# Patient Record
(demographics unavailable — no encounter records)

---

## 2024-10-31 NOTE — REVIEW OF SYSTEMS
Cardiovascular/Thoracic Surgery Transfer of Care:  3/16/2023    PCP: Cedrick Nelson NP    Cardiologist:  Dustin Wilson MD    Nephrologist:  Padmini Jones MD (Consult 4/20/23)    Requesting Physician: Dsutin Wilson MD    Reason for Referral:  mitral valve disease    Chief Complaint:  shortness of breath, fatigue and dyspnea on exertion    Previous History:  HFrEF (Life Vest), pneumonia, DM II, HTN, HLD, CKD III, RA    History of Present Illness:  Dina Rodriguez is a pleasant 80year old seen in clinic today for evaluation of mitral valve regurgitation. Patient reports being very active including hiking 4-5 days/week, swimming and completing all ADLs prior to pneumonia hospitalization in November 2022. Since admission, patient has had increased shortness of breath, PND and fatigue. Notes now she uses 2 pillows at night. Denies orthopnea. Used to be able to climb stairs with ease and now must take 1 stair at a time. Dina Rodriguez is currently undergoing treatment with [facility] for bilateral lower extremity posterior wounds s/p weeping edema. Denies palpitations, fatigue, dizziness or syncope.     Past Medical History:    Diabetes mellitus type 2, uncontrolled, withou*               Essential hypertension, benign                                Hyperlipemia                                                  Rheumatoid arthritis(714.0)                                   Osteopenia                                                    Cataract                                                        Comment: bilateral    Past Surgical History:    PAP SMEAR                                       06/20/2012    DEXA BONE DENSITY AXIAL SKELETON                07/13/2011    ORAL SURGERY PROCEDURE                                        ALLERGIES:  No Known Allergies    Current Outpatient Medications   Medication Sig   â¢ insulin glargine (Lantus SoloStar) 100 UNIT/ML pen-injector Inject 32 units into the skin nightly for 3 days, then increase to 36 units nightly. Increase insulin dose by 4 units, every 3 days until fasting sugars are <130. Prime 2 units before each dose. â¢ Insulin Aspart FlexPen (NovoLOG FLEXPEN) 100 UNIT/ML pen-injector Sliding scale three times daily and as needed. <150=none, 151-200=2 units, 201-250=4 units, 251-300=6 units, 301-350=8 units, 351-400=10 units, >400=10 units; if >400 two checks in a row call provider's office. Prime 2 units before each dose. â¢ metoPROLOL succinate (TOPROL-XL) 25 MG 24 hr tablet Take 1.5 tablets by mouth in the morning and 1.5 tablets in the evening. â¢ empagliflozin (Jardiance) 10 MG tablet Take 1 tablet by mouth daily (before breakfast). â¢ spironolactone (ALDACTONE) 25 MG tablet Take 0.5 tablets by mouth daily. â¢ sacubitril-valsartan (Entresto) 24-26 MG per tablet Take 1 tablet by mouth in the morning and 1 tablet in the evening. â¢ furosemide (LASIX) 40 MG tablet Take 1 tablet by mouth daily. â¢ Insulin Pen Needle 32G X 6 MM Misc Use new needle to inject insulin as directed daily. â¢ magnesium oxide (MAG-OX) 400 MG tablet Take 1 tablet by mouth daily. â¢ simvastatin (ZOCOR) 40 MG tablet Take 1 tablet by mouth nightly. â¢ cyanocobalamin 500 MCG tablet Take 1 tablet by mouth daily. Do not start before November 4, 2022. â¢ ferrous sulfate 325 (65 FE) MG EC tablet Take 1 tablet by mouth daily (with breakfast). â¢ cholecalciferol (VITAMIN D3) 1000 UNITS tablet Take 1,000 Units by mouth daily. â¢ aspirin 81 MG tablet Take 81 mg by mouth daily. â¢ calcium carbonate-vitamin D (CALTRATE+D) 600-400 MG-UNIT per tablet Take 1 tablet by mouth daily. No current facility-administered medications for this visit.        Social History     Tobacco Use   Smoking Status Never   Smokeless Tobacco Never       Social History     Substance and Sexual Activity   Alcohol Use No       Social History     Substance and Sexual Activity   Drug Use No       Occupation: Retired   Living Situation: "Independent with , [place] home     Family History   Problem Relation Age of Onset   â¢ High blood pressure Mother    â¢ Heart disease Mother    â¢ High blood pressure Father    â¢ Diabetes Father    â¢ Heart disease Father    â¢ Heart disease Brother        Review of Systems:    10 point Review of Systems obtained; all pertinent positives stated above in HPI (History of Present Illness), otherwise negative      Physical Exam:    Visit Vitals  BP 98/60   Pulse 92   Resp 16   Ht 4' 11"" (1.499 m)   Wt 45 kg (99 lb 3.3 oz)   SpO2 98%   BMI 20.04 kg/mÂ²     Ht Readings from Last 1 Encounters:   03/16/23 4' 11\"" (1.499 m)     Wt Readings from Last 1 Encounters:   03/16/23 45 kg (99 lb 3.3 oz)     Body mass index is 20.04 kg/mÂ². General:  female, no acute distress, well developed, well nourished and well groomed. Eyes:  normal sclerae. Mouth:  no cyanosis of oral mucosa. LAST DENTAL VISIT:  Last year  Neck:  no JVD. Cardiovascular:  Edema BLE pedal 1+  Extremities:  normal gait/station. Respiratory:  no wheezing/crackles/rhonchi/stridor. Abdomen:  no tenderness. Skin:  warm. Psychiatric:  oriented x 3. Labs:    Lab Results   Component Value Date    SODIUM 140 03/03/2023    POTASSIUM 4.9 03/03/2023    CHLORIDE 105 03/03/2023    CO2 19 (L) 03/03/2023    GLUCOSE 138 (H) 03/03/2023    BUN 40 (H) 03/03/2023    CREATININE 1.43 (H) 03/03/2023    CALCIUM 8.7 03/03/2023    ALBUMIN 3.3 (L) 12/06/2022    BILIRUBIN 0.3 12/06/2022    AST 28 12/06/2022    INR 1.1 01/23/2023     Lab Results   Component Value Date    PTT 25 12/16/2022    WBC 9.5 01/23/2023    HGB 10.5 (L) 01/23/2023    HCT 35.3 (L) 01/23/2023     01/23/2023       Diagnostics:  Echo Limited 3/16/23  Imaging reviewed  Formal report to follow    MEHRDAD 3/13/23  No left atrial appendage thrombus.   Moderate-severe mitral valve regurgitation in setting of profound dilated cardiomyopathy and tented mitral leaflets, /21,  systolic reversal of the right " sided pulmonary veins noted. Echo Limited 2/14/23  Moderately increased left ventricular chamber size. Normal left ventricular wall thickness. Left ventricular regional wall motion abnormalities (see diagram). Severely increased left atrial chamber size. Pulmonary artery systolic pressure; 32 mmHg. Moderate to Severe mitral valve regurgitation. Compared to prior echo the anterior wall appears to have a greater degree of hypokinesis and the aortic stenosis is less prominent. Cardiac Catheterization 2/14/23  â¢ Prox LAD lesion with 25% stenosis. â¢ Ost RCA to Prox RCA lesion with 60% stenosis. â¢ The ejection fraction is estimated to be 25%. â¢ There is no aortic valve stenosis. â¢ There is severe (4+) mitral regurgitation. Echo 12/8/23  Dilated left ventricle (EDVI > 100) with severely reduced systolic function, EF 33%, GLS -6%. Flattening of the septum in diastole and systole consistent with right ventricular volume and pressure overload. Mildly decreased right ventricular systolic function. Moderate to severe pulmonary hypertension; RVSP 59 mmHg. Immobile right coronary cusp of the aortic valve. Moderate aortic valve stenosis with planimetered YSABEL 1.3 cm-sq. Moderate-severe mitral valve regurgitation. Moderate tricuspid valve regurgitation. Severely increased left atrial chamber size. Dilated ascending aorta measuring 3.60 cm. No pericardial effusion. Left pleural effusion identified. Patient was evaluated in Cardiology after this study.     STS:  MVR 12.178, MVr 8.547  NYHA:  III    FRAILTY    Chair Raises:  Â· Five Chair Raises less than 15 seconds = 0 Points  Â· Five Chair Raises greater than 15 seconds = 1 Point  Â· Unable to Complete = 2 Point            Score:  2      Cognitive Impairment:  Â· No Cognitive Impairment = 0 Points  Â· Cognitive Impairment = 1 Point             Score:  0    Hemoglobin:  Â· Men  Â· >/= 13.0 g/dL = 0 Points  Â· < 13.0 g/dL = 1 Point  Â· Women  Â· >/= 12.0 g/dL = 0 Points  Â· < 12.0 g/dL = 1 Point          Score:  1    Albumin:  Â· >/= 3.5 g/dL = 0 Points  Â· < 3.5 g/dL = 1 Point                                                                             Score:  1      TOTAL:  4        Impression/Plan:  Severe Mitral Valve Regurgitation  Moderate Tricuspid Valve Regurgitation  HFrEF  CKD III    Imaging reviewed. Patient at extreme risk for surgery secondary to HFrEF, age, frailty and CKD III. Patient is not a candidate for sternotomy surgical intervention. Discussed with patient medical management versus transcatheter intervention. Recommend transcatheter mitral valve intervention evaluation with Dr Shantel Parekh . We have discussed the operation with the patient including risks such as death and stroke, expected course recovery and approach. I thank Dr. Sunitha Baez very much for the consultation. Risks, benefits, and alternatives were discussed with the patient and she agrees to proceed. Â· STS risk calculator score was calculated and discussed with the patient/family prior to surgery as documented in the medical record         We recommend patient to undergo a FRANDY procedure secondary to:    Mitral Leaflet Clip Procedure Indication (select all that apply)  Predicted STS MV Repair Operative Mortality Risk of >=6% (for patients deemed likely to undergo MV repair), Predicted STS MV Replacement Operative Mort Risk >=8% ( for patients deemed likely to undergo MV replacement) and Frailty (assessed by in-person Cardiac surgeon consultation)    As part of shared decision making the above mentioned procedure including the details, risks, benefits and alternatives to the procedure were discussed in detail with the patient. The STS risk calculator was used and determined score was discussed with patient and documented in medical record. The patient has been provided verbal and written education, has had the opportunity to ask questions, is in agreement, and wishes to proceed.  Patient will be monitored per TVT protocol as required by Medicare. Patient to be entered into the TVT registry. TVT registry Z00.6 CT [id number]    Patient does  wish to receive blood products if necessary. On 3/16/2023, I, Sherin Lanes, RN scribed the services personally performed by Tomi Garza MD    The documentation recorded by the scribe accurately and completely reflects the service(s) I personally performed and the decisions made by me.        CC:  Dallas Martinez [As Noted in HPI] : as noted in HPI [Negative] : Heme/Lymph

## 2024-10-31 NOTE — HISTORY OF PRESENT ILLNESS
[de-identified] : prior evaluation of thyroid nodule. cytologically benign. denies dysphagia, hoarseness or new lesions. no changes medically since last visit. recent sonogram stable. recently retired. I have reviewed all old and new data and available images.

## 2024-10-31 NOTE — PHYSICAL EXAM
[de-identified] : 2.5 cm left thyroid nodule, well circumscribed and mobile [Laryngoscopy Performed] : laryngoscopy was performed, see procedure section for findings [Midline] : located in midline position [Normal] : orientation to person, place, and time: normal